# Patient Record
Sex: MALE | Race: WHITE
[De-identification: names, ages, dates, MRNs, and addresses within clinical notes are randomized per-mention and may not be internally consistent; named-entity substitution may affect disease eponyms.]

---

## 2020-06-15 ENCOUNTER — HOSPITAL ENCOUNTER (EMERGENCY)
Dept: HOSPITAL 72 - EMR | Age: 30
Discharge: HOME | End: 2020-06-15
Payer: MEDICAID

## 2020-06-15 VITALS — DIASTOLIC BLOOD PRESSURE: 84 MMHG | SYSTOLIC BLOOD PRESSURE: 186 MMHG

## 2020-06-15 VITALS — BODY MASS INDEX: 21.67 KG/M2 | HEIGHT: 72 IN | WEIGHT: 160 LBS

## 2020-06-15 DIAGNOSIS — L03.90: Primary | ICD-10-CM

## 2020-06-15 DIAGNOSIS — Z85.9: ICD-10-CM

## 2020-06-15 DIAGNOSIS — B86: ICD-10-CM

## 2020-06-15 PROCEDURE — 99282 EMERGENCY DEPT VISIT SF MDM: CPT

## 2020-06-15 NOTE — EMERGENCY ROOM REPORT
History of Present Illness


General


Chief Complaint:  Skin Rash/Abscess


Source:  Patient





Present Illness


HPI


30-year-old male with no significant past medical history here complaining of 

pruritic rash all over body including perianal and reporting that sometimes he 

sees pus drainage from some sites of rash.  Denies any fever or chills.  Denies 

coming contact with any allergens.  Has not taken medication for symptom 

relief.  Denies cough and congestion, shortness of breath.  Denies any 

constipation, bloody stool, no pus drainage noted from the macular lesion all 

over body.  Denies use of any drugs.  Denies any penile discharge, dysuria, no 

other associated symptoms.  Denies nausea vomiting abdominal pain.  Peers to be 

anxious and on the possible influence of unknown stimulant.


Allergies:  


Coded Allergies:  


     No Known Allergies (Unverified , 10/21/18)





COVID-19 Screening


Contact w/high risk pt:  No


Recent Travel to affected area:  No


Experienced COVID-19 symptoms?:  No


COVID-19 Testing performed PTA:  No





Patient History


Past Medical History:  see triage record


Past Surgical History:  none


Pertinent Family History:  none


Immunizations:  UTD


Reviewed Nursing Documentation:  PMH: Agreed; PSxH: Agreed





Nursing Documentation-PMH


Past Medical History:  No Stated History


Hx Cancer:  Yes





Review of Systems


All Other Systems:  negative except mentioned in HPI





Physical Exam





Vital Signs








  Date Time  Temp Pulse Resp B/P (MAP) Pulse Ox O2 Delivery O2 Flow Rate FiO2


 


6/15/20 13:24 98.4 108 17 186/84 (118) 98 Room Air  








Sp02 EP Interpretation:  reviewed, normal


General Appearance:  no apparent distress, alert, GCS 15, non-toxic


Head:  normocephalic, atraumatic


Eyes:  bilateral eye normal inspection, bilateral eye PERRL


ENT:  hearing grossly normal, normal pharynx, no angioedema, normal voice


Neck:  full range of motion, supple/symm/no masses


Respiratory:  chest non-tender, lungs clear, normal breath sounds, no rhonchi, 

no respiratory distress, no retraction, speaking full sentences


Cardiovascular #1:  regular rate, rhythm, no edema, no murmur


Gastrointestinal:  normal bowel sounds, non tender, soft, non-distended, no 

guarding, no rebound


Rectal:  deferred


Genitourinary:  no CVA tenderness


Musculoskeletal:  back normal


Neurologic:  alert, motor strength/tone normal, oriented x3, sensory intact, 

responsive, speech normal


Psychiatric:  judgement/insight normal, memory normal, mood/affect normal, no 

suicidal/homicidal ideation


Skin:  rash - Macular rash-like with burrow like presentation all over body 

including perianal without any pus drainage


Lymphatic:  no adenopathy





Medical Decision Making


PA Attestation


All my diagnosis and treatment plans were reviewed ad discussed with my 

supervising physician Dr. Arndt


Diagnostic Impression:  


 Primary Impression:  


 Cellulitis


 Additional Impression:  


 Scabies


ER Course


30-year-old male with no significant past medical history here complaining of 

pruritic rash all over body including perianal and reporting that sometimes he 

sees pus drainage from some sites of rash.  Denies any fever or chills.  Denies 

coming contact with any allergens.  Has not taken medication for symptom 

relief.  Denies cough and congestion, shortness of breath.  Denies any 

constipation, bloody stool, no pus drainage noted from the macular lesion all 

over body.  Denies use of any drugs.  Denies any penile discharge, dysuria, no 

other associated symptoms.  Denies nausea vomiting abdominal pain.  Peers to be 

anxious and on the possible influence of unknown stimulant.





Ddx considered but are not limited to: Eczema, scabies, lice,





Vital signs: are WNL, pt. is afebrile





H&PE are most consistent with: Scabies, cellulitis due to pruritus





ORDERS: Permethrin, Keflex


ED INTERVENTIONS: None required at this time.








DISCHARGE: At this time pt. is stable for d/c to home. Will provide printed 

patient care instructions, and any necessary prescriptions. Care plan and 

follow up instructions have been discussed with the patient prior to discharge.


Patient take medication as directed, follow primary doctor and dermatologist, 

if worsening symptoms return to the emergency room.  Also wash all clothes and 

bedding.





Patient was evaluated in the context of the global COVID-19 pandemic, which 

necessitated consideration that the patient might be at risk for infection with 

the SARS-COV-2 virus that causes COVID-19.  Institutional protocols and 

algorithms that pertain to the evaluation of patients at risk for COVID-19 are 

in a state of rapid change based on information relieved by multiple regulatory 

bodies including the CDC and the federal and state organizations.  These 

policies and algorithms were followed during the patient's care in the ED.





Last Vital Signs








  Date Time  Temp Pulse Resp B/P (MAP) Pulse Ox O2 Delivery O2 Flow Rate FiO2


 


6/15/20 13:24 98.4 108 17 186/84 (118 98 Room Air  








Disposition:  HOME, SELF-CARE


Condition:  Stable


Scripts


Permethrin* (ELIMITE*) 60 Gm Cream..g.


1 APPLIC TOPIC ONCE, #60 GM 0 Refills


   Apply cream from head to toe; leave on for 8-14 hours before washing


   off with


   water; may reapply in 1 week if live mites appear.


   Prov: Juli Lindsey         6/15/20 


Cephalexin* (KEFLEX*) 500 Mg Tablet


500 MG ORAL EVERY 6 HOURS for 7 Days, #28 CAP


   Prov: Juli Lindsey         6/15/20


Patient Instructions:  Cellulitis, Easy-to-Read, Scabies, Pediatric





Additional Instructions:  


Wash all clothes, wear loose clothing, take medication as directed, if 

worsening symptoms return to the emergency room.  Follow-up with your primary 

doctor.











Juli Lindsey Ramiro 15, 2020 13:56

## 2020-07-29 ENCOUNTER — HOSPITAL ENCOUNTER (EMERGENCY)
Dept: HOSPITAL 72 - EMR | Age: 30
Discharge: HOME | End: 2020-07-29
Payer: MEDICAID

## 2020-07-29 VITALS — WEIGHT: 160 LBS | BODY MASS INDEX: 21.43 KG/M2 | HEIGHT: 72.25 IN

## 2020-07-29 DIAGNOSIS — R21: Primary | ICD-10-CM

## 2020-07-29 DIAGNOSIS — Z85.9: ICD-10-CM

## 2020-07-29 PROCEDURE — 99282 EMERGENCY DEPT VISIT SF MDM: CPT

## 2020-07-29 NOTE — EMERGENCY ROOM REPORT
History of Present Illness


General


Chief Complaint:  Skin Rash/Abscess


Source:  Patient





Present Illness


HPI


Disclaimer: Please note that this report is being documented using DRAGON 

technology. This can lead to erroneous entry secondary to incorrect 

interpretation by the dictating instrument.





HPI: 30-year-old male presents for evaluation of skin rash.  Patient states he 

has multiple small abscesses over his arms and legs that he has been popping 

over the past month.  He was seen in the emergency department similar 

complaints prescribed permethrin cream and Keflex which he states helped 

briefly.  States this started when he was living on the street and has not gone 

away.  Denies fever, chills, nausea, vomiting.  Denies injecting medications or 

illicit drugs.  Has not yet followed up with PMD or dermatology.


 


PMH: Reviewed


 


PSH: Reviewed


 


Allergies: Reviewed


 


Social Hx: Reviewed


Allergies:  


Coded Allergies:  


     No Known Allergies (Unverified , 10/21/18)





COVID-19 Screening


Contact w/high risk pt:  No


Recent Travel to affected area:  No


Experienced COVID-19 symptoms?:  No


COVID-19 Testing performed PTA:  No





Nursing Documentation-PMH


Past Medical History:  No Stated History


Hx Cancer:  Yes





Review of Systems


All Other Systems:  negative except mentioned in HPI





Physical Exam





Vital Signs








  Date Time  Temp Pulse Resp B/P (MAP) Pulse Ox O2 Delivery O2 Flow Rate FiO2


 


7/29/20 12:31 98.2 107 20  95 Room Air  





 





General: Awake and alert, no acute distress


HEENT: NC/AT. EOMI. 


Resp: Normal work of breathing


Skin: Multiple round flat erythematous regions over the lower and upper 

extremities.  Region over the left patella somewhat tender to palpation 

erythematous and slightly warm but no fluctuance.  No palpable deep space 

infections.  No active drainage.  Multiple excoriation marks over the body.  

None in a linear tract.  No findings in the finger webbing's.  Nikolsky 

negative.


MSK: Normal tone and bulk. Moving all extremities.  No obvious deformity.


Neuro: Awake and alert.  Mentating appropriately





Medical Decision Making


Diagnostic Impression:  


 Primary Impression:  


 Rash and other nonspecific skin eruption


ER Course


Is a 30-year-old male presenting for evaluation of skin rash.  Differential 

includes was not limited to folliculitis, cellulitis, abscess, insect bite, lice

, scabies to name a few.  There are no palpable drainable abscesses and patient'

s multiple wounds are different stages of healing consistent with his picking 

behavior.  He denies injecting any IV drugs or medications.  Patient does 

appear to have a superficial cellulitis over the left knee over recently popped 

pustule.  No evidence of infection into the joint space itself.  Will start 

Bactrim and at patient request refill of the permethrin cream as he states it 

helped last time.  I strongly encouraged him to follow-up with dermatology and 

his PMD which he has not seen in some time.  We discussed reasons to return to 

the ED.  He is stable for outpatient follow-up.





Last Vital Signs








  Date Time  Temp Pulse Resp B/P (MAP) Pulse Ox O2 Delivery O2 Flow Rate FiO2


 


7/29/20 12:31 98.2 107 20  95 Room Air  








Disposition:  HOME, SELF-CARE


Condition:  Stable


Scripts


Trimethoprim/Sulfamethoxazole 160/800* (BACTRIM DS TABLET*) 1 Each Tablet


1 TAB ORAL Q12H, #14 TAB 0 Refills


   Prov: Rashad Hernandez MD         7/29/20 


Permethrin* (ELIMITE*) 60 Gm Cream..g.


1 APPLIC TOPIC ONCE, #60 GM 0 Refills


   Apply cream from head to toe; leave on for 8-14 hours before washing


   off with


   water; may reapply in 1 week if live mites appear.


   Prov: Rashad Hernandez MD         7/29/20


Referrals:  


[facility]











[facility]. [facility]


Patient Instructions:  Abscess





Additional Instructions:  


Please follow-up with your primary care doctor in the next 1 to 3 days to 

discuss this emergency department visit and for reevaluation.  Ask for referral 

to dermatology to discuss recurrent skin infections.  If you have any new or 

worsening symptoms please return to the emergency department for reevaluation.  





Please note that this report is being documented using DRAGON technology.


This can lead to erroneous entry secondary to incorrect interpretation by the 

dictating instrument.











Rashad Hernandez MD Jul 29, 2020 12:55